# Patient Record
Sex: FEMALE | Race: WHITE | HISPANIC OR LATINO | Employment: UNEMPLOYED | URBAN - METROPOLITAN AREA
[De-identification: names, ages, dates, MRNs, and addresses within clinical notes are randomized per-mention and may not be internally consistent; named-entity substitution may affect disease eponyms.]

---

## 2022-08-21 ENCOUNTER — HOSPITAL ENCOUNTER (EMERGENCY)
Facility: HOSPITAL | Age: 22
Discharge: HOME | End: 2022-08-21
Attending: EMERGENCY MEDICINE
Payer: MEDICAID

## 2022-08-21 VITALS
OXYGEN SATURATION: 99 % | RESPIRATION RATE: 18 BRPM | HEIGHT: 60 IN | DIASTOLIC BLOOD PRESSURE: 60 MMHG | SYSTOLIC BLOOD PRESSURE: 106 MMHG | WEIGHT: 150 LBS | TEMPERATURE: 99 F | BODY MASS INDEX: 29.45 KG/M2 | HEART RATE: 72 BPM

## 2022-08-21 DIAGNOSIS — S60.222A CONTUSION OF LEFT HAND, INITIAL ENCOUNTER: ICD-10-CM

## 2022-08-21 DIAGNOSIS — M79.642 HAND PAIN, LEFT: Primary | ICD-10-CM

## 2022-08-21 DIAGNOSIS — M79.89 SWELLING OF LEFT HAND: ICD-10-CM

## 2022-08-21 LAB
B-HCG UR QL: NEGATIVE
CTP QC/QA: YES

## 2022-08-21 PROCEDURE — 25000003 PHARM REV CODE 250: Performed by: NURSE PRACTITIONER

## 2022-08-21 PROCEDURE — 99283 EMERGENCY DEPT VISIT LOW MDM: CPT | Mod: 25

## 2022-08-21 PROCEDURE — 81025 URINE PREGNANCY TEST: CPT | Performed by: NURSE PRACTITIONER

## 2022-08-21 PROCEDURE — 29125 APPL SHORT ARM SPLINT STATIC: CPT | Mod: LT

## 2022-08-21 RX ORDER — NAPROXEN 500 MG/1
500 TABLET ORAL EVERY 12 HOURS
Qty: 10 TABLET | Refills: 0 | Status: SHIPPED | OUTPATIENT
Start: 2022-08-21 | End: 2022-08-26

## 2022-08-21 RX ORDER — NAPROXEN 500 MG/1
500 TABLET ORAL
Status: COMPLETED | OUTPATIENT
Start: 2022-08-21 | End: 2022-08-21

## 2022-08-21 RX ADMIN — NAPROXEN 500 MG: 500 TABLET ORAL at 01:08

## 2022-08-21 NOTE — ED PROVIDER NOTES
Encounter Date: 8/21/2022       History     Chief Complaint   Patient presents with    Hand Pain     Patient reports fall onto left hand on Friday,  left thumb is swollen and painful     CC: Left Hand Pain    HPI: Mayte Pugh, a 22 y.o. female presents to the ED with pain to left hand isolated around the thumb that has been ongoing following a fall that occurred 2 days ago.  She reports no wrist pain, pain to the remainder of the hand, forearm pain, elbow pain, or upper arm pain.  Attempted treatment with ice prior to arrival.  She has pain with movement of the left thumb and left index finger.  She is left-hand dominant.    There is no problem list on file for this patient.      The history is provided by the patient. No  was used.     Review of patient's allergies indicates:  No Known Allergies  History reviewed. No pertinent past medical history.  History reviewed. No pertinent surgical history.  Family History   Problem Relation Age of Onset    No Known Problems Mother     No Known Problems Father      Social History     Tobacco Use    Smoking status: Never Smoker    Smokeless tobacco: Never Used   Substance Use Topics    Alcohol use: No    Drug use: No     Review of Systems   Constitutional: Negative for fever.   HENT:        (-) head injury   Gastrointestinal: Negative for vomiting.   Musculoskeletal: Positive for arthralgias and joint swelling.        (-) Wrist Pain, Forearm Pain, Elbow Pain, or upper arm/shoulder pain   Skin: Negative for color change, pallor, rash and wound.   Neurological: Negative for seizures, weakness and numbness.   Psychiatric/Behavioral: Negative for confusion.       Physical Exam     Initial Vitals [08/21/22 1341]   BP Pulse Resp Temp SpO2   106/60 72 18 98.6 °F (37 °C) 99 %      MAP       --         Physical Exam    Nursing note and vitals reviewed.  Constitutional: She appears well-developed and well-nourished. She is not diaphoretic. She is  cooperative.  Non-toxic appearance. She does not have a sickly appearance. She does not appear ill. No distress.   HENT:   Head: Normocephalic and atraumatic.   Right Ear: External ear normal.   Left Ear: External ear normal.   Nose: Nose normal.   Mouth/Throat: Mucous membranes are normal. No trismus in the jaw.   Neck: Phonation normal.   Normal range of motion.  Cardiovascular: Normal rate, regular rhythm and intact distal pulses.   Pulses:       Radial pulses are 2+ on the left side.   Pulmonary/Chest: No respiratory distress.   Musculoskeletal:      Left shoulder: Normal.      Left upper arm: Normal.      Left elbow: Normal.      Left forearm: Normal.      Left wrist: Normal. No swelling, tenderness, bony tenderness or snuff box tenderness. Normal range of motion.      Left hand: Swelling (Proximal thumb and thenar eminence), tenderness and bony tenderness present. Decreased range of motion (thumb 2/2 pain). Normal sensation. There is no disruption of two-point discrimination. Normal capillary refill. Normal pulse.      Cervical back: Normal range of motion.     Neurological: She is alert and oriented to person, place, and time. No sensory deficit. Coordination normal. GCS eye subscore is 4. GCS verbal subscore is 5. GCS motor subscore is 6.   Skin: Skin is warm, dry and intact. Capillary refill takes less than 2 seconds. Bruising (thenar eminence) noted. No laceration and no rash noted. No cyanosis or erythema.   Psychiatric: She has a normal mood and affect. Her speech is normal and behavior is normal. Judgment and thought content normal.         ED Course   Procedures  Labs Reviewed   POCT URINE PREGNANCY          Imaging Results          X-Ray Hand 3 view Left (Final result)  Result time 08/21/22 14:38:31    Final result by Hunter Moreira MD (08/21/22 14:38:31)                 Impression:      1. No acute displaced fracture or dislocation of the hand noting nonspecific dorsal edema.      Electronically  signed by: Hunter Moreira MD  Date:    08/21/2022  Time:    14:38             Narrative:    EXAMINATION:  XR HAND COMPLETE 3 VIEW LEFT    CLINICAL HISTORY:  Pain;.    TECHNIQUE:  PA, lateral, and oblique views of the left hand were performed.    COMPARISON:  None    FINDINGS:  Three views left hand.    No acute displaced fracture or dislocation of the hand.  No radiopaque foreign body.  There is edema about the dorsal aspect of the hand.                                 Medications   naproxen tablet 500 mg (500 mg Oral Given 8/21/22 0549)           APC / Resident Notes:   This is an evaluation of a 22 y.o. female that presents to the Emergency Department for left hand pain following a fall. Physical Exam shows a non-toxic, afebrile, and well appearing female.  There is swelling and tenderness to the left thumb digit/metacarpal and thenar eminence as well as the proximal 2nd digit.  Some very mild bruising.  No tenderness the remainder of the hand or wrist. No snuffbox tenderness. Vital signs are reassuring. If available, previous records reviewed. RESULTS: UPT Negative. Xray left hand x-ray of the hand without acute fracture dislocation.  There is nonspecific dorsal edema noted.     My overall impression is right hand pain following injury with contusion. I considered, but at this time, do not suspect acute displaced fracture, dislocation, septic joint, cellulitis, compartment syndrome.  After review the x-ray, I have re-examined the patient.  Again she remains without snuffbox tenderness.  There is no tenderness with axial loading of the thumb.  Low suspicion for scaphoid fracture at this time.  Patient has been encouraged to return if pain not improving or pain worsens.     ED Course:  Velcro thumb spica. Discharge Meds/Instructions:  Rice therapy, naproxen. The diagnosis, treatment plan, instructions for follow-up as well as ED return precautions were discussed. All questions have been answered.  NINA Matihs  MANJEET LOMELI                 Clinical Impression:   Final diagnoses:  [M79.642] Hand pain, left (Primary)  [S60.222A] Contusion of left hand, initial encounter  [M79.89] Swelling of left hand          ED Disposition Condition    Discharge Stable        ED Prescriptions     Medication Sig Dispense Start Date End Date Auth. Provider    naproxen (NAPROSYN) 500 MG tablet Take 1 tablet (500 mg total) by mouth every 12 (twelve) hours. Do not take any additional NSAIDs while you are taking this medication including (Advil, Aleve, Motrin, Ibuprofen, Mobic\meloxicam, Naprosyn, Toradol, ketoralac, etc.). for 5 days 10 tablet 8/21/2022 8/26/2022 YONATAN Estevez        Follow-up Information     Follow up With Specialties Details Why Contact Info    Your Primary Care Doctor  Schedule an appointment as soon as possible for a visit  Please call and schedule an appointment for follow up this week.     [facility]  Schedule an appointment as soon as possible for a visit  For Follow-Up, This Week, If you do not have a Primary Care Doctor [address] [place] [address]  [phone]      [facility] Emergency Dept Emergency Medicine Go to  If symptoms worsen [address] [facility] [id number] [phone]           YONATAN Estevez  08/21/22 1456       YONATAN Estevez  08/21/22 1453

## 2022-08-21 NOTE — Clinical Note
"Mayte ALCANTAR"Mayte Pugh was seen and treated in our emergency department on 8/21/2022.  She may return with limitations on 08/23/2022.       Sincerely,      Shameka DANIEL"

## 2022-08-21 NOTE — DISCHARGE INSTRUCTIONS
§ Please return to the Emergency Department for any new or worsening symptoms including: fever, chest pain, shortness of breath, loss of consciousness, dizziness, weakness, or any other concerns.     § Schedule an appointment for follow up with your Primary Care Doctor as soon as possible for a recheck of your symptoms. If you do not have one, contact the one listed on your discharge paperwork or call the [facility] Appointment Desk at [phone] to schedule an appointment.     § Please take all medication as prescribed. You have been prescribed Naproxen for pain. This is an Non-Steroidal Anti-Inflammatory (NSAID) Medication. Please do not take any additional NSAIDs while you are taking this medication including (Advil, Aleve, Motrin, Ibuprofen, Mobic\meloxicam, Naprosyn, Toradol, ketoralac, etc.). Please stop taking this medication if you experience: weakness, itching, yellow skin or eyes, joint pains, vomiting blood, blood or black stools, unusual weight gain, or swelling in your arms, legs, hands, or feet.     Velcro splint to help with pain (should not need more than 1 week - if pain not improving, come back for a recheck). Rest, Use Ice Pack, Compress (use the Splint), and Elevate to help with pain and swelling.

## 2023-07-18 ENCOUNTER — HOSPITAL ENCOUNTER (EMERGENCY)
Facility: HOSPITAL | Age: 23
Discharge: HOME | End: 2023-07-18
Attending: EMERGENCY MEDICINE
Payer: MEDICAID

## 2023-07-18 VITALS
RESPIRATION RATE: 14 BRPM | TEMPERATURE: 99 F | HEART RATE: 86 BPM | BODY MASS INDEX: 29.45 KG/M2 | WEIGHT: 150 LBS | HEIGHT: 60 IN | DIASTOLIC BLOOD PRESSURE: 57 MMHG | SYSTOLIC BLOOD PRESSURE: 131 MMHG | OXYGEN SATURATION: 98 %

## 2023-07-18 DIAGNOSIS — H60.333 ACUTE SWIMMER'S EAR OF BOTH SIDES: Primary | ICD-10-CM

## 2023-07-18 LAB
B-HCG UR QL: NEGATIVE
CTP QC/QA: YES

## 2023-07-18 PROCEDURE — 99283 EMERGENCY DEPT VISIT LOW MDM: CPT

## 2023-07-18 PROCEDURE — 81025 URINE PREGNANCY TEST: CPT | Performed by: EMERGENCY MEDICINE

## 2023-07-18 RX ORDER — NEOMYCIN SULFATE, POLYMYXIN B SULFATE AND HYDROCORTISONE 10; 3.5; 1 MG/ML; MG/ML; [USP'U]/ML
4 SUSPENSION/ DROPS AURICULAR (OTIC) 3 TIMES DAILY
Qty: 10 ML | Refills: 0 | Status: SHIPPED | OUTPATIENT
Start: 2023-07-18

## 2023-07-18 NOTE — ED PROVIDER NOTES
"Encounter Date: 7/18/2023    SCRIBE #1 NOTE: I, SHRUTI GATES, am scribing for, and in the presence of,  Gordon Friend PA-C. I have scribed the following portions of the note - Other sections scribed: HPI, ROS.     History     Chief Complaint   Patient presents with    Otalgia     Pt reports bilateral ear pain x Sunday. Reports L worse than R. Denies any meds pta.      23-year-old female with no pertinent PMHx, presents to the ED with a chief complaint of left otalgia for a few weeks. Patient states that she has been having intermittent left otalgia that started to worsen two days ago. She further states that the pain feels "throbbing" and reports additional left-sided neck soreness. She states that she recently went swimming in a beach two weeks ago. She further states that she has a Hx of similar otalgia one year ago and was seen by an ENT specialist. She further states that the ENT specialist "shot something in my ear" that alleviated her otalgia. No other exacerbating or alleviating factors. Denies any fever, cough, rhinorrhea, or other associated symptoms.     The history is provided by the patient. No  was used.   Review of patient's allergies indicates:  No Known Allergies  History reviewed. No pertinent past medical history.  History reviewed. No pertinent surgical history.  Family History   Problem Relation Age of Onset    No Known Problems Mother     No Known Problems Father      Social History     Tobacco Use    Smoking status: Never    Smokeless tobacco: Never   Substance Use Topics    Alcohol use: No    Drug use: No     Review of Systems   Constitutional:  Negative for fever.   HENT:  Positive for ear pain (left). Negative for congestion, rhinorrhea, sore throat and trouble swallowing.    Respiratory:  Negative for cough and shortness of breath.    Cardiovascular:  Negative for chest pain.   Gastrointestinal:  Negative for abdominal pain, constipation, diarrhea, nausea and " vomiting.   Genitourinary:  Negative for dysuria, flank pain, frequency and urgency.   Musculoskeletal:  Positive for neck pain (left-sided). Negative for back pain.   Skin:  Negative for rash.   Neurological:  Negative for headaches.   All other systems reviewed and are negative.    Physical Exam     Initial Vitals [07/18/23 1745]   BP Pulse Resp Temp SpO2   (!) 131/57 86 14 98.6 °F (37 °C) 98 %      MAP       --         Physical Exam    Nursing note and vitals reviewed.  Constitutional: She appears well-developed and well-nourished. She is not diaphoretic. No distress.   HENT:   Head: Atraumatic.   Tenderness to bilateral external ear and over the tragus; worse on the left.  Left ear canal swelling.  Bilateral ear canal erythema with exudates.  TMs and mastoids normal bilaterally.   Eyes: Conjunctivae and EOM are normal.   Neck: No tracheal deviation present.   Normal range of motion.  Cardiovascular:  Normal rate and regular rhythm.           Pulmonary/Chest: No accessory muscle usage or stridor. No tachypnea. No respiratory distress.   Musculoskeletal:         General: No edema. Normal range of motion.      Cervical back: Normal range of motion.     Neurological: She is alert and oriented to person, place, and time. She displays no tremor. She displays no seizure activity. Coordination and gait normal.   Skin: Skin is intact. Capillary refill takes less than 2 seconds. No rash noted. No erythema.       ED Course   Procedures  Labs Reviewed   POCT URINE PREGNANCY          Imaging Results    None          Medications - No data to display  Medical Decision Making:   History:   Old Medical Records: I decided to obtain old medical records.  Old Records Summarized: other records.  Clinical Tests:   Lab Tests: Ordered and Reviewed  ED Management:  This is an emergent evaluation of a 23 y.o. female presenting to the ED for otalgia. Denies trauma, fever, and hearing loss. Afebrile. Patient is non-toxic.    Presentation  consistent with OE. No obstruction/impaction or TM perforation. No signs of auricular perichondritis, AOM, bullous myringitis, and mastoiditis. I doubt referred pain from dental etiology, bacterial rhinosinusitis, and deep space head/neck infection. No evidence to suggest herpes zoster oticus or otomycosis. No signs of cholesteatoma. Less consistent with inner ear etiology, including for labyrinthitis and meniere's.      No indication for ear wick placement today. Discharged home with supportive care. Advised patient avoid activities that will place them at risk for retaining water in the ears. Instructed to follow up with PCP and ENT for reevaluation and management of symptoms.     I discussed the diagnosis, treatment plan, indications for return to the emergency department, and for expected follow-up. The patient/family/caretaker verbalized an understanding. The patient/family/caretaker are asked if there are any questions or concerns. We discuss the case, until all issues are addressed to the patient/family/caretaker's satisfaction. Patient/family/caretaker understands and is agreeable to the plan.          Scribe Attestation:   Scribe #1: I performed the above scribed service and the documentation accurately describes the services I performed. I attest to the accuracy of the note.                 Scribe attestation: I, Gordon Friend PA-C, personally performed the services described in this documentation. All medical record entries made by the scribe were at my direction and in my presence.  I have reviewed the chart and agree that the record reflects my personal performance and is accurate and complete.   Clinical Impression:   Final diagnoses:  [H60.333] Acute swimmer's ear of both sides (Primary)        ED Disposition Condition    Discharge Stable          ED Prescriptions       Medication Sig Dispense Start Date End Date Auth. Provider    neomycin-polymyxin-hydrocortisone (CORTISPORIN) 3.5-10,000-1  mg/mL-unit/mL-% otic suspension Place 4 drops into both ears 3 (three) times daily. 10 mL 7/18/2023 -- Gordon Friend PA-C          Follow-up Information       Follow up With Specialties Details Why Contact Info    [facility]  Schedule an appointment as soon as possible for a visit in 1 day For reevaluation, AND to establish primary if you don't have a  [address] [place] [address]  [phone]      [facility] ENT Otolaryngology Schedule an appointment as soon as possible for a visit in 1 day For further evaluation, For more definitive management of your symptoms [address]  [facility] [address]  [phone]    [facility] Emergency Dept Emergency Medicine Go to  If symptoms worsen or new symptoms develop [address]  [facility] [id number] [phone]             Gordon Friend PA-C  07/18/23 8924

## 2023-07-18 NOTE — DISCHARGE INSTRUCTIONS
Thank you for coming to our Emergency Department today. It is important to remember that some problems or medical conditions are difficult to diagnose and may not be found or addressed during your Emergency Department visit.  These conditions often start with non-specific symptoms and can only be diagnosed on follow up visits with your primary care physician or specialist when the symptoms continue or change. Please remember that all medical conditions can change, and we cannot predict how you will be feeling tomorrow or the next day. Return to the ER with any questions/concerns, new/concerning symptoms, worsening or failure to improve.       Be sure to follow up with your primary care doctor and review all labs/imaging/tests that were performed during your ER visit with them. It is very common for us to identify non-emergent incidental findings which must be followed up with your primary care physician.  Some labs/imaging/tests may be outside of the normal range, and require non-emergent follow-up and/or further investigation/treatment/procedures/testing to help diagnose/exclude/prevent complications or other potentially serious medical conditions. Some abnormalities may not have been discussed or addressed during your ER visit.     An ER visit does not replace a primary care visit, and many screening tests or follow-up tests cannot be ordered by an ER doctor or performed by the ER. Some tests may even require pre-approval.    If you do not have a primary care doctor, you may contact the one listed on your discharge paperwork or you may also call the [facility] Appointment Desk at [phone] , or [address] [place] [address] at  [phone] to schedule an appointment, or establish care with a primary care doctor or even a specialist and to obtain information about local resources. It is important to your health that you have a primary care doctor.    Please take all medications as directed. We have done our best to select  a medication for you that will treat your condition however, all medications may potentially have side-effects and it is impossible to predict which medications may give you side-effects or what those side-effects (if any) those medications may give you.  If you feel that you are having a negative effect or side-effect of any medication you should stop taking those medications immediately and seek medical attention. If you feel that you are having a life-threatening reaction call 911.        Do not drive, swim, climb to height, take a bath, operate heavy machinery, drink alcohol or take potentially sedating medications, sign any legal documents or make any important decisions for 24 hours if you have received any pain medications, sedatives or mood altering drugs during your ER visit or within 24 hours of taking them if they have been prescribed to you.     You can find additional resources for Dentists, hearing aids, durable medical equipment, low cost pharmacies and other resources at [email or web address]